# Patient Record
Sex: MALE | Race: WHITE
[De-identification: names, ages, dates, MRNs, and addresses within clinical notes are randomized per-mention and may not be internally consistent; named-entity substitution may affect disease eponyms.]

---

## 2021-10-26 DIAGNOSIS — Z78.9 OTHER SPECIFIED HEALTH STATUS: ICD-10-CM

## 2021-10-26 DIAGNOSIS — Z92.89 PERSONAL HISTORY OF OTHER MEDICAL TREATMENT: ICD-10-CM

## 2021-10-26 DIAGNOSIS — Z86.39 PERSONAL HISTORY OF OTHER ENDOCRINE, NUTRITIONAL AND METABOLIC DISEASE: ICD-10-CM

## 2021-10-26 DIAGNOSIS — Z87.09 PERSONAL HISTORY OF OTHER DISEASES OF THE RESPIRATORY SYSTEM: ICD-10-CM

## 2021-10-26 DIAGNOSIS — R68.89 OTHER GENERAL SYMPTOMS AND SIGNS: ICD-10-CM

## 2021-10-26 DIAGNOSIS — B97.89 OTHER VIRAL AGENTS AS THE CAUSE OF DISEASES CLASSIFIED ELSEWHERE: ICD-10-CM

## 2021-10-26 DIAGNOSIS — B35.1 TINEA UNGUIUM: ICD-10-CM

## 2021-10-26 DIAGNOSIS — Z87.39 PERSONAL HISTORY OF OTHER DISEASES OF THE MUSCULOSKELETAL SYSTEM AND CONNECTIVE TISSUE: ICD-10-CM

## 2021-10-26 DIAGNOSIS — Z80.0 FAMILY HISTORY OF MALIGNANT NEOPLASM OF DIGESTIVE ORGANS: ICD-10-CM

## 2021-10-26 DIAGNOSIS — Z80.49 FAMILY HISTORY OF MALIGNANT NEOPLASM OF OTHER GENITAL ORGANS: ICD-10-CM

## 2021-10-26 DIAGNOSIS — Z01.00 ENCOUNTER FOR EXAMINATION OF EYES AND VISION W/OUT ABNORMAL FINDINGS: ICD-10-CM

## 2021-10-26 DIAGNOSIS — Z01.84 ENCOUNTER FOR ANTIBODY RESPONSE EXAMINATION: ICD-10-CM

## 2021-10-26 DIAGNOSIS — Z86.69 PERSONAL HISTORY OF OTHER DISEASES OF THE NERVOUS SYSTEM AND SENSE ORGANS: ICD-10-CM

## 2021-10-26 PROBLEM — Z00.00 ENCOUNTER FOR PREVENTIVE HEALTH EXAMINATION: Status: ACTIVE | Noted: 2021-10-26

## 2021-10-26 RX ORDER — TIMOLOL MALEATE 5 MG/ML
0.5 SOLUTION OPHTHALMIC TWICE DAILY
Refills: 0 | Status: ACTIVE | COMMUNITY

## 2021-10-26 RX ORDER — CETIRIZINE HYDROCHLORIDE 10 MG/1
10 TABLET, COATED ORAL DAILY
Refills: 0 | Status: ACTIVE | COMMUNITY

## 2021-10-26 RX ORDER — ANTACID TABLETS 500 MG/1
500 TABLET, CHEWABLE ORAL
Refills: 0 | Status: ACTIVE | COMMUNITY

## 2021-11-19 ENCOUNTER — APPOINTMENT (OUTPATIENT)
Dept: INTERNAL MEDICINE | Facility: CLINIC | Age: 76
End: 2021-11-19
Payer: MEDICARE

## 2021-11-19 ENCOUNTER — NON-APPOINTMENT (OUTPATIENT)
Age: 76
End: 2021-11-19

## 2021-11-19 VITALS
DIASTOLIC BLOOD PRESSURE: 80 MMHG | TEMPERATURE: 98.2 F | SYSTOLIC BLOOD PRESSURE: 140 MMHG | WEIGHT: 186 LBS | HEIGHT: 72 IN | HEART RATE: 78 BPM | BODY MASS INDEX: 25.19 KG/M2 | OXYGEN SATURATION: 97 % | RESPIRATION RATE: 16 BRPM

## 2021-11-19 DIAGNOSIS — N40.1 BENIGN PROSTATIC HYPERPLASIA WITH LOWER URINARY TRACT SYMPMS: ICD-10-CM

## 2021-11-19 DIAGNOSIS — Z00.00 ENCOUNTER FOR GENERAL ADULT MEDICAL EXAMINATION W/OUT ABNORMAL FINDINGS: ICD-10-CM

## 2021-11-19 DIAGNOSIS — K21.00 GASTRO-ESOPHAGEAL REFLUX DISEASE WITH ESOPHAGITIS, WITHOUT BLEEDING: ICD-10-CM

## 2021-11-19 LAB
BILIRUB UR QL STRIP: NEGATIVE
CLARITY UR: CLEAR
COLLECTION METHOD: NORMAL
GLUCOSE UR-MCNC: NEGATIVE
HCG UR QL: 0.2 EU/DL
HGB UR QL STRIP.AUTO: NEGATIVE
KETONES UR-MCNC: NEGATIVE
LEUKOCYTE ESTERASE UR QL STRIP: NEGATIVE
NITRITE UR QL STRIP: NEGATIVE
PH UR STRIP: 7
PROT UR STRIP-MCNC: NEGATIVE
SP GR UR STRIP: 1.01

## 2021-11-19 PROCEDURE — 36415 COLL VENOUS BLD VENIPUNCTURE: CPT

## 2021-11-19 PROCEDURE — 99213 OFFICE O/P EST LOW 20 MIN: CPT | Mod: 25

## 2021-11-19 PROCEDURE — G0439: CPT

## 2021-11-19 PROCEDURE — 93000 ELECTROCARDIOGRAM COMPLETE: CPT | Mod: 59

## 2021-11-19 PROCEDURE — G0442 ANNUAL ALCOHOL SCREEN 15 MIN: CPT | Mod: 59

## 2021-11-19 PROCEDURE — 81003 URINALYSIS AUTO W/O SCOPE: CPT | Mod: QW

## 2021-11-19 RX ORDER — NETARSUDIL AND LATANOPROST OPHTHALMIC SOLUTION, 0.02%/0.005% .2; .05 MG/ML; MG/ML
SOLUTION/ DROPS OPHTHALMIC; TOPICAL DAILY
Refills: 0 | Status: ACTIVE | COMMUNITY

## 2021-11-19 RX ORDER — ZOLPIDEM TARTRATE 10 MG/1
10 TABLET ORAL
Qty: 30 | Refills: 5 | Status: DISCONTINUED | COMMUNITY
End: 2021-11-19

## 2021-11-19 NOTE — PHYSICAL EXAM
[Normal] : normal gait, coordination grossly intact, no focal deficits [FreeTextEntry1] : Enlarged prostate with no nodules

## 2021-11-19 NOTE — HEALTH RISK ASSESSMENT
[Yes] : Yes [4 or more  times a week (4 pts)] : 4 or more  times a week (4 points) [1 or 2 (0 pts)] : 1 or 2 (0 points) [Never (0 pts)] : Never (0 points) [No] : In the past 12 months have you used drugs other than those required for medical reasons? No [No falls in past year] : Patient reported no falls in the past year [0] : 2) Feeling down, depressed, or hopeless: Not at all (0) [PHQ-2 Negative - No further assessment needed] : PHQ-2 Negative - No further assessment needed [PHQ-9 Negative - No further assessment needed] : PHQ-9 Negative - No further assessment needed [Fully functional (bathing, dressing, toileting, transferring, walking, feeding)] : Fully functional (bathing, dressing, toileting, transferring, walking, feeding) [Fully functional (using the telephone, shopping, preparing meals, housekeeping, doing laundry, using] : Fully functional and needs no help or supervision to perform IADLs (using the telephone, shopping, preparing meals, housekeeping, doing laundry, using transportation, managing medications and managing finances) [I will adhere to the patient's wishes.] : I will adhere to the patient's wishes. [Time Spent: ___ minutes] : Time Spent: [unfilled] minutes [] : No [Audit-CScore] : 4 [Designated Healthcare Proxy] : Designated healthcare proxy [AdvancecareDate] : 11/19/21 [FreeTextEntry4] : Discussed with patient.  Suggested the discussion with the healthcare proxy about quality of life issues.  Suggested a discussion on withholding treatment and no further therapy in any given circumstance as determined by the patient.

## 2021-11-19 NOTE — HISTORY OF PRESENT ILLNESS
[de-identified] : Patient comes for an annual physical.  Continues to have the same complaints of early morning coughing with sputum production.  Continues to sleep with his mouth open.\par Continues to have nonspecific intermittent occasional pain in various joints especially of his hips with no limitation in functional ability.\par Taking same medicines with no changes.  Seen by ENT and ophthalmology.

## 2021-11-22 ENCOUNTER — TRANSCRIPTION ENCOUNTER (OUTPATIENT)
Age: 76
End: 2021-11-22

## 2021-11-22 LAB
ALBUMIN SERPL ELPH-MCNC: 4.4 G/DL
ALP BLD-CCNC: 70 U/L
ALT SERPL-CCNC: 20 U/L
ANION GAP SERPL CALC-SCNC: 12 MMOL/L
AST SERPL-CCNC: 20 U/L
BASOPHILS # BLD AUTO: 0.06 K/UL
BASOPHILS NFR BLD AUTO: 1 %
BILIRUB SERPL-MCNC: 0.2 MG/DL
BUN SERPL-MCNC: 9 MG/DL
CALCIUM SERPL-MCNC: 9.6 MG/DL
CHLORIDE SERPL-SCNC: 102 MMOL/L
CHOLEST SERPL-MCNC: 181 MG/DL
CO2 SERPL-SCNC: 24 MMOL/L
CREAT SERPL-MCNC: 0.66 MG/DL
EOSINOPHIL # BLD AUTO: 0.18 K/UL
EOSINOPHIL NFR BLD AUTO: 3 %
GLUCOSE SERPL-MCNC: 106 MG/DL
HCT VFR BLD CALC: 44.7 %
HDLC SERPL-MCNC: 63 MG/DL
HGB BLD-MCNC: 14.5 G/DL
IMM GRANULOCYTES NFR BLD AUTO: 0.3 %
LDLC SERPL CALC-MCNC: 86 MG/DL
LYMPHOCYTES # BLD AUTO: 1.46 K/UL
LYMPHOCYTES NFR BLD AUTO: 24.3 %
MAN DIFF?: NORMAL
MCHC RBC-ENTMCNC: 30.5 PG
MCHC RBC-ENTMCNC: 32.4 GM/DL
MCV RBC AUTO: 94.1 FL
MONOCYTES # BLD AUTO: 0.6 K/UL
MONOCYTES NFR BLD AUTO: 10 %
NEUTROPHILS # BLD AUTO: 3.68 K/UL
NEUTROPHILS NFR BLD AUTO: 61.4 %
NONHDLC SERPL-MCNC: 118 MG/DL
PLATELET # BLD AUTO: 370 K/UL
POTASSIUM SERPL-SCNC: 5.1 MMOL/L
PROT SERPL-MCNC: 7.2 G/DL
PSA SERPL-MCNC: 0.91 NG/ML
RBC # BLD: 4.75 M/UL
RBC # FLD: 13.9 %
SODIUM SERPL-SCNC: 138 MMOL/L
TRIGL SERPL-MCNC: 161 MG/DL
TSH SERPL-ACNC: 3.4 UIU/ML
WBC # FLD AUTO: 6 K/UL

## 2022-03-18 RX ORDER — TAMSULOSIN HYDROCHLORIDE 0.4 MG/1
0.4 CAPSULE ORAL
Qty: 180 | Refills: 3 | Status: ACTIVE | COMMUNITY
Start: 1900-01-01 | End: 1900-01-01

## 2022-11-22 ENCOUNTER — APPOINTMENT (OUTPATIENT)
Dept: INTERNAL MEDICINE | Facility: CLINIC | Age: 77
End: 2022-11-22

## 2024-02-14 ENCOUNTER — OFFICE (OUTPATIENT)
Dept: URBAN - METROPOLITAN AREA CLINIC 30 | Facility: CLINIC | Age: 79
Setting detail: OPHTHALMOLOGY
End: 2024-02-14
Payer: MEDICARE

## 2024-02-14 DIAGNOSIS — H40.1123: ICD-10-CM

## 2024-02-14 DIAGNOSIS — H25.13: ICD-10-CM

## 2024-02-14 DIAGNOSIS — H40.1113: ICD-10-CM

## 2024-02-14 DIAGNOSIS — H33.011: ICD-10-CM

## 2024-02-14 PROBLEM — H21.563 PUPILLARY ABNORMALITIES; BOTH EYES: Status: ACTIVE | Noted: 2024-02-14

## 2024-02-14 PROCEDURE — 92133 CPTRZD OPH DX IMG PST SGM ON: CPT | Performed by: OPHTHALMOLOGY

## 2024-02-14 PROCEDURE — 92083 EXTENDED VISUAL FIELD XM: CPT | Performed by: OPHTHALMOLOGY

## 2024-02-14 PROCEDURE — 92020 GONIOSCOPY: CPT | Performed by: OPHTHALMOLOGY

## 2024-02-14 PROCEDURE — 99214 OFFICE O/P EST MOD 30 MIN: CPT | Performed by: OPHTHALMOLOGY

## 2024-02-14 PROCEDURE — 92201 OPSCPY EXTND RTA DRAW UNI/BI: CPT | Performed by: OPHTHALMOLOGY

## 2024-02-14 ASSESSMENT — REFRACTION_AUTOREFRACTION
OS_SPHERE: -10.00
OS_CYLINDER: +3.25
OD_SPHERE: -10.75
OD_AXIS: 155
OD_CYLINDER: +1.25
OS_AXIS: 170

## 2024-02-14 ASSESSMENT — SPHEQUIV_DERIVED
OD_SPHEQUIV: -10.125
OS_SPHEQUIV: -8.375

## 2024-02-14 ASSESSMENT — LID EXAM ASSESSMENTS
OD_BLEPHARITIS: 1+
OS_BLEPHARITIS: 1+

## 2024-02-14 ASSESSMENT — REFRACTION_CURRENTRX
OD_AXIS: 180
OD_AXIS: 180
OD_VPRISM_DIRECTION: SV
OS_OVR_VA: 20/
OD_OVR_VA: 20/
OD_CYLINDER: +2.50
OS_CYLINDER: +1.50
OS_SPHERE: -9.00
OD_CYLINDER: +2.50
OS_OVR_VA: 20/
OS_SPHERE: -6.75
OD_SPHERE: -10.50
OS_AXIS: 005
OS_CYLINDER: +1.50
OS_VPRISM_DIRECTION: SV
OD_OVR_VA: 20/
OD_SPHERE: -7.75
OS_AXIS: 005

## 2024-02-14 ASSESSMENT — CONFRONTATIONAL VISUAL FIELD TEST (CVF)
OS_FINDINGS: SUPERIOR HALF DEFECT
OD_FINDINGS: SUPERIOR HALF DEFECT

## 2024-03-27 ENCOUNTER — OFFICE (OUTPATIENT)
Dept: URBAN - METROPOLITAN AREA CLINIC 30 | Facility: CLINIC | Age: 79
Setting detail: OPHTHALMOLOGY
End: 2024-03-27
Payer: MEDICARE

## 2024-03-27 DIAGNOSIS — H25.12: ICD-10-CM

## 2024-03-27 DIAGNOSIS — H25.13: ICD-10-CM

## 2024-03-27 DIAGNOSIS — H01.001: ICD-10-CM

## 2024-03-27 DIAGNOSIS — H52.213: ICD-10-CM

## 2024-03-27 DIAGNOSIS — H52.13: ICD-10-CM

## 2024-03-27 DIAGNOSIS — H33.011: ICD-10-CM

## 2024-03-27 PROCEDURE — 92136 OPHTHALMIC BIOMETRY: CPT | Mod: 26,LT | Performed by: OPHTHALMOLOGY

## 2024-03-27 PROCEDURE — 92136 OPHTHALMIC BIOMETRY: CPT | Mod: TC | Performed by: OPHTHALMOLOGY

## 2024-03-27 PROCEDURE — 92025 CPTRIZED CORNEAL TOPOGRAPHY: CPT | Performed by: OPHTHALMOLOGY

## 2024-03-27 PROCEDURE — 99213 OFFICE O/P EST LOW 20 MIN: CPT | Performed by: OPHTHALMOLOGY

## 2024-03-27 ASSESSMENT — REFRACTION_CURRENTRX
OD_AXIS: 180
OD_CYLINDER: +2.50
OD_OVR_VA: 20/
OD_OVR_VA: 20/
OS_SPHERE: -6.75
OS_AXIS: 005
OD_AXIS: 180
OS_CYLINDER: +1.50
OS_OVR_VA: 20/
OD_SPHERE: -10.50
OS_SPHERE: -9.00
OD_VPRISM_DIRECTION: SV
OD_SPHERE: -7.75
OD_CYLINDER: +2.50
OS_OVR_VA: 20/
OS_AXIS: 005
OS_CYLINDER: +1.50
OS_VPRISM_DIRECTION: SV

## 2024-03-27 ASSESSMENT — LID EXAM ASSESSMENTS
OS_BLEPHARITIS: 1+
OD_BLEPHARITIS: 1+

## 2024-03-28 PROBLEM — H52.213 IRREGULAR ASTIGMATISM; BOTH EYES: Status: ACTIVE | Noted: 2024-03-27

## 2024-06-27 ENCOUNTER — AMBULATORY SURGERY CENTER (OUTPATIENT)
Dept: URBAN - METROPOLITAN AREA SURGERY 17 | Facility: SURGERY | Age: 79
Setting detail: OPHTHALMOLOGY
End: 2024-06-27
Payer: MEDICARE

## 2024-06-27 DIAGNOSIS — H25.12: ICD-10-CM

## 2024-06-27 DIAGNOSIS — H40.1123: ICD-10-CM

## 2024-06-27 DIAGNOSIS — H21.502: ICD-10-CM

## 2024-06-27 DIAGNOSIS — H52.212: ICD-10-CM

## 2024-06-27 PROCEDURE — V2787 ASTIGMATISM-CORRECT FUNCTION: HCPCS | Performed by: OPHTHALMOLOGY

## 2024-06-27 PROCEDURE — 66170 GLAUCOMA SURGERY: CPT | Mod: LT | Performed by: OPHTHALMOLOGY

## 2024-06-27 PROCEDURE — 66982 XCAPSL CTRC RMVL CPLX WO ECP: CPT | Mod: LT | Performed by: OPHTHALMOLOGY

## 2024-06-28 ENCOUNTER — OFFICE (OUTPATIENT)
Dept: URBAN - METROPOLITAN AREA CLINIC 29 | Facility: CLINIC | Age: 79
Setting detail: OPHTHALMOLOGY
End: 2024-06-28
Payer: MEDICARE

## 2024-06-28 ENCOUNTER — RX ONLY (RX ONLY)
Age: 79
End: 2024-06-28

## 2024-06-28 DIAGNOSIS — H40.1113: ICD-10-CM

## 2024-06-28 DIAGNOSIS — H33.011: ICD-10-CM

## 2024-06-28 DIAGNOSIS — H40.1123: ICD-10-CM

## 2024-06-28 DIAGNOSIS — H01.004: ICD-10-CM

## 2024-06-28 DIAGNOSIS — Z96.1: ICD-10-CM

## 2024-06-28 DIAGNOSIS — H43.813: ICD-10-CM

## 2024-06-28 DIAGNOSIS — H01.001: ICD-10-CM

## 2024-06-28 DIAGNOSIS — H25.11: ICD-10-CM

## 2024-06-28 DIAGNOSIS — H52.213: ICD-10-CM

## 2024-06-28 DIAGNOSIS — H52.13: ICD-10-CM

## 2024-06-28 DIAGNOSIS — H21.563: ICD-10-CM

## 2024-06-28 PROCEDURE — 99024 POSTOP FOLLOW-UP VISIT: CPT | Performed by: OPHTHALMOLOGY

## 2024-06-28 PROCEDURE — 92136 OPHTHALMIC BIOMETRY: CPT | Mod: 26,RT | Performed by: OPHTHALMOLOGY

## 2024-06-28 ASSESSMENT — LID EXAM ASSESSMENTS
OD_BLEPHARITIS: 1+
OS_BLEPHARITIS: 1+

## 2024-06-28 ASSESSMENT — CONFRONTATIONAL VISUAL FIELD TEST (CVF)
OD_FINDINGS: FULL
OS_FINDINGS: FULL

## 2024-07-10 ENCOUNTER — OFFICE (OUTPATIENT)
Dept: URBAN - METROPOLITAN AREA CLINIC 30 | Facility: CLINIC | Age: 79
Setting detail: OPHTHALMOLOGY
End: 2024-07-10
Payer: MEDICARE

## 2024-07-10 DIAGNOSIS — H25.11: ICD-10-CM

## 2024-07-10 PROCEDURE — 92136 OPHTHALMIC BIOMETRY: CPT | Mod: 26,RT | Performed by: OPHTHALMOLOGY

## 2024-07-10 ASSESSMENT — CONFRONTATIONAL VISUAL FIELD TEST (CVF)
OS_FINDINGS: FULL
OD_FINDINGS: FULL

## 2024-07-10 ASSESSMENT — LID EXAM ASSESSMENTS
OS_BLEPHARITIS: 1+
OD_BLEPHARITIS: 1+

## 2024-07-11 ENCOUNTER — AMBULATORY SURGERY CENTER (OUTPATIENT)
Dept: URBAN - METROPOLITAN AREA SURGERY 17 | Facility: SURGERY | Age: 79
Setting detail: OPHTHALMOLOGY
End: 2024-07-11
Payer: MEDICARE

## 2024-07-11 DIAGNOSIS — H21.561: ICD-10-CM

## 2024-07-11 DIAGNOSIS — H25.11: ICD-10-CM

## 2024-07-11 DIAGNOSIS — H40.1113: ICD-10-CM

## 2024-07-11 DIAGNOSIS — H52.211: ICD-10-CM

## 2024-07-11 PROCEDURE — 66982 XCAPSL CTRC RMVL CPLX WO ECP: CPT | Mod: 79,RT | Performed by: OPHTHALMOLOGY

## 2024-07-11 PROCEDURE — V2787 ASTIGMATISM-CORRECT FUNCTION: HCPCS | Performed by: OPHTHALMOLOGY

## 2024-07-11 PROCEDURE — 66170 GLAUCOMA SURGERY: CPT | Mod: 79,RT | Performed by: OPHTHALMOLOGY

## 2024-07-12 ENCOUNTER — OFFICE (OUTPATIENT)
Dept: URBAN - METROPOLITAN AREA CLINIC 29 | Facility: CLINIC | Age: 79
Setting detail: OPHTHALMOLOGY
End: 2024-07-12
Payer: MEDICARE

## 2024-07-12 ENCOUNTER — RX ONLY (RX ONLY)
Age: 79
End: 2024-07-12

## 2024-07-12 DIAGNOSIS — H21.563: ICD-10-CM

## 2024-07-12 DIAGNOSIS — H33.011: ICD-10-CM

## 2024-07-12 DIAGNOSIS — H40.1123: ICD-10-CM

## 2024-07-12 DIAGNOSIS — H43.813: ICD-10-CM

## 2024-07-12 DIAGNOSIS — H01.004: ICD-10-CM

## 2024-07-12 DIAGNOSIS — H52.213: ICD-10-CM

## 2024-07-12 DIAGNOSIS — H25.11: ICD-10-CM

## 2024-07-12 DIAGNOSIS — H40.1113: ICD-10-CM

## 2024-07-12 DIAGNOSIS — Z96.1: ICD-10-CM

## 2024-07-12 DIAGNOSIS — H01.001: ICD-10-CM

## 2024-07-12 PROCEDURE — 99024 POSTOP FOLLOW-UP VISIT: CPT | Performed by: OPHTHALMOLOGY

## 2024-07-12 ASSESSMENT — CONFRONTATIONAL VISUAL FIELD TEST (CVF)
OS_FINDINGS: FULL
OD_FINDINGS: FULL

## 2024-07-12 ASSESSMENT — LID EXAM ASSESSMENTS
OD_BLEPHARITIS: 1+
OS_BLEPHARITIS: 1+

## 2024-07-17 ENCOUNTER — OFFICE (OUTPATIENT)
Dept: URBAN - METROPOLITAN AREA CLINIC 30 | Facility: CLINIC | Age: 79
Setting detail: OPHTHALMOLOGY
End: 2024-07-17
Payer: MEDICARE

## 2024-07-17 DIAGNOSIS — H21.563: ICD-10-CM

## 2024-07-17 DIAGNOSIS — H33.011: ICD-10-CM

## 2024-07-17 DIAGNOSIS — H01.004: ICD-10-CM

## 2024-07-17 DIAGNOSIS — H01.001: ICD-10-CM

## 2024-07-17 DIAGNOSIS — H40.1113: ICD-10-CM

## 2024-07-17 DIAGNOSIS — Z96.1: ICD-10-CM

## 2024-07-17 DIAGNOSIS — H40.1123: ICD-10-CM

## 2024-07-17 DIAGNOSIS — H43.813: ICD-10-CM

## 2024-07-17 DIAGNOSIS — H52.13: ICD-10-CM

## 2024-07-17 PROCEDURE — 99024 POSTOP FOLLOW-UP VISIT: CPT | Performed by: OPHTHALMOLOGY

## 2024-07-17 ASSESSMENT — LID EXAM ASSESSMENTS
OS_BLEPHARITIS: 1+
OD_BLEPHARITIS: 1+

## 2024-07-17 ASSESSMENT — CONFRONTATIONAL VISUAL FIELD TEST (CVF)
OS_FINDINGS: FULL
OD_FINDINGS: FULL

## 2024-08-07 ENCOUNTER — OFFICE (OUTPATIENT)
Dept: URBAN - METROPOLITAN AREA CLINIC 30 | Facility: CLINIC | Age: 79
Setting detail: OPHTHALMOLOGY
End: 2024-08-07
Payer: MEDICARE

## 2024-08-07 DIAGNOSIS — H33.011: ICD-10-CM

## 2024-08-07 DIAGNOSIS — H21.563: ICD-10-CM

## 2024-08-07 DIAGNOSIS — H01.001: ICD-10-CM

## 2024-08-07 DIAGNOSIS — Z96.1: ICD-10-CM

## 2024-08-07 DIAGNOSIS — H43.813: ICD-10-CM

## 2024-08-07 DIAGNOSIS — H40.1123: ICD-10-CM

## 2024-08-07 DIAGNOSIS — H01.004: ICD-10-CM

## 2024-08-07 DIAGNOSIS — H52.13: ICD-10-CM

## 2024-08-07 DIAGNOSIS — H40.1113: ICD-10-CM

## 2024-08-07 PROCEDURE — 99024 POSTOP FOLLOW-UP VISIT: CPT | Performed by: OPHTHALMOLOGY

## 2024-08-07 ASSESSMENT — LID EXAM ASSESSMENTS
OD_BLEPHARITIS: 1+
OS_BLEPHARITIS: 1+

## 2024-08-07 ASSESSMENT — CONFRONTATIONAL VISUAL FIELD TEST (CVF)
OS_FINDINGS: FULL
OD_FINDINGS: FULL

## 2024-10-23 ENCOUNTER — OFFICE (OUTPATIENT)
Facility: LOCATION | Age: 79
Setting detail: OPHTHALMOLOGY
End: 2024-10-23
Payer: MEDICARE

## 2024-10-23 DIAGNOSIS — H01.004: ICD-10-CM

## 2024-10-23 DIAGNOSIS — Z96.1: ICD-10-CM

## 2024-10-23 DIAGNOSIS — H40.1113: ICD-10-CM

## 2024-10-23 DIAGNOSIS — H40.1123: ICD-10-CM

## 2024-10-23 DIAGNOSIS — H33.011: ICD-10-CM

## 2024-10-23 DIAGNOSIS — H43.813: ICD-10-CM

## 2024-10-23 DIAGNOSIS — H21.563: ICD-10-CM

## 2024-10-23 DIAGNOSIS — H52.13: ICD-10-CM

## 2024-10-23 DIAGNOSIS — H01.001: ICD-10-CM

## 2024-10-23 PROCEDURE — 92083 EXTENDED VISUAL FIELD XM: CPT | Performed by: OPHTHALMOLOGY

## 2024-10-23 PROCEDURE — 92020 GONIOSCOPY: CPT | Performed by: OPHTHALMOLOGY

## 2024-10-23 PROCEDURE — 92201 OPSCPY EXTND RTA DRAW UNI/BI: CPT | Performed by: OPHTHALMOLOGY

## 2024-10-23 PROCEDURE — 99214 OFFICE O/P EST MOD 30 MIN: CPT | Performed by: OPHTHALMOLOGY

## 2024-10-23 ASSESSMENT — REFRACTION_CURRENTRX
OS_AXIS: 005
OD_SPHERE: -10.50
OS_AXIS: 005
OD_OVR_VA: 20/
OS_OVR_VA: 20/
OD_OVR_VA: 20/
OS_SPHERE: -9.00
OS_SPHERE: -6.75
OD_SPHERE: -7.75
OD_AXIS: 180
OD_CYLINDER: +2.50
OS_CYLINDER: +1.50
OD_VPRISM_DIRECTION: SV
OS_OVR_VA: 20/
OD_CYLINDER: +2.50
OS_OVR_VA: 20/
OS_CYLINDER: +1.50
OD_OVR_VA: 20/
OS_VPRISM_DIRECTION: SV
OS_CYLINDER: +0.75
OD_AXIS: 180
OD_SPHERE: -1.75
OS_SPHERE: -3.00
OS_AXIS: 75

## 2024-10-23 ASSESSMENT — REFRACTION_MANIFEST
OD_VA1: 20/30-2
OD_SPHERE: -2.00
OS_AXIS: 75
OS_VA1: 20/40
OD_AXIS: 095
OD_CYLINDER: +1.00
OD_ADD: +3.00
OS_SPHERE: -3.00
OS_ADD: +3.00
OS_SPHERE: -3.00
OS_AXIS: 080
OS_CYLINDER: +1.00
OD_VA1: 20/50
OD_SPHERE: -1.75
OS_CYLINDER: +0.75
OS_VA1: 20/30-2

## 2024-10-23 ASSESSMENT — REFRACTION_AUTOREFRACTION
OD_SPHERE: -13.75
OS_CYLINDER: +2.25
OS_SPHERE: -4.25
OS_AXIS: 060
OD_AXIS: 003
OD_CYLINDER: +6.75

## 2024-10-23 ASSESSMENT — CONFRONTATIONAL VISUAL FIELD TEST (CVF)
OD_FINDINGS: FULL
OS_FINDINGS: FULL

## 2024-10-23 ASSESSMENT — PACHYMETRY
OD_CT_UM: 531
OD_CT_CORRECTION: 1

## 2024-10-23 ASSESSMENT — VISUAL ACUITY
OD_BCVA: 20/50
OS_BCVA: 20/30-2

## 2024-10-23 ASSESSMENT — LID EXAM ASSESSMENTS
OD_BLEPHARITIS: 1+
OS_BLEPHARITIS: 1+